# Patient Record
Sex: MALE | Race: WHITE | NOT HISPANIC OR LATINO
[De-identification: names, ages, dates, MRNs, and addresses within clinical notes are randomized per-mention and may not be internally consistent; named-entity substitution may affect disease eponyms.]

---

## 2017-06-14 ENCOUNTER — APPOINTMENT (OUTPATIENT)
Dept: OPHTHALMOLOGY | Facility: CLINIC | Age: 72
End: 2017-06-14

## 2017-06-14 PROBLEM — Z00.00 ENCOUNTER FOR PREVENTIVE HEALTH EXAMINATION: Status: ACTIVE | Noted: 2017-06-14

## 2017-11-20 ENCOUNTER — OUTPATIENT (OUTPATIENT)
Dept: OUTPATIENT SERVICES | Facility: HOSPITAL | Age: 72
LOS: 1 days | Discharge: HOME | End: 2017-11-20

## 2017-12-13 ENCOUNTER — APPOINTMENT (OUTPATIENT)
Dept: OPHTHALMOLOGY | Facility: CLINIC | Age: 72
End: 2017-12-13
Payer: COMMERCIAL

## 2017-12-13 PROCEDURE — 92014 COMPRE OPH EXAM EST PT 1/>: CPT

## 2018-06-13 ENCOUNTER — APPOINTMENT (OUTPATIENT)
Dept: OPHTHALMOLOGY | Facility: CLINIC | Age: 73
End: 2018-06-13
Payer: COMMERCIAL

## 2018-06-13 PROCEDURE — 92014 COMPRE OPH EXAM EST PT 1/>: CPT

## 2018-06-26 ENCOUNTER — RX RENEWAL (OUTPATIENT)
Age: 73
End: 2018-06-26

## 2018-06-26 DIAGNOSIS — H10.409 UNSPECIFIED CHRONIC CONJUNCTIVITIS, UNSPECIFIED EYE: ICD-10-CM

## 2018-06-26 RX ORDER — OFLOXACIN 3 MG/ML
0.3 SOLUTION/ DROPS OPHTHALMIC
Qty: 5 | Refills: 1 | Status: ACTIVE | COMMUNITY
Start: 2018-06-26 | End: 1900-01-01

## 2018-06-27 ENCOUNTER — RX RENEWAL (OUTPATIENT)
Age: 73
End: 2018-06-27

## 2018-12-04 ENCOUNTER — APPOINTMENT (OUTPATIENT)
Dept: OPHTHALMOLOGY | Facility: CLINIC | Age: 73
End: 2018-12-04
Payer: COMMERCIAL

## 2018-12-04 PROCEDURE — 92014 COMPRE OPH EXAM EST PT 1/>: CPT

## 2018-12-04 RX ORDER — DOXYCYCLINE HYCLATE 50 MG/1
50 CAPSULE ORAL
Qty: 30 | Refills: 3 | Status: ACTIVE | COMMUNITY
Start: 2018-12-04 | End: 1900-01-01

## 2019-06-05 ENCOUNTER — NON-APPOINTMENT (OUTPATIENT)
Age: 74
End: 2019-06-05

## 2019-06-05 ENCOUNTER — APPOINTMENT (OUTPATIENT)
Dept: OPHTHALMOLOGY | Facility: CLINIC | Age: 74
End: 2019-06-05
Payer: COMMERCIAL

## 2019-06-05 PROCEDURE — 92012 INTRM OPH EXAM EST PATIENT: CPT

## 2019-12-11 ENCOUNTER — APPOINTMENT (OUTPATIENT)
Dept: OPHTHALMOLOGY | Facility: CLINIC | Age: 74
End: 2019-12-11
Payer: COMMERCIAL

## 2019-12-11 ENCOUNTER — NON-APPOINTMENT (OUTPATIENT)
Age: 74
End: 2019-12-11

## 2019-12-11 PROCEDURE — 92014 COMPRE OPH EXAM EST PT 1/>: CPT

## 2020-06-17 ENCOUNTER — APPOINTMENT (OUTPATIENT)
Dept: OPHTHALMOLOGY | Facility: CLINIC | Age: 75
End: 2020-06-17
Payer: COMMERCIAL

## 2020-06-17 ENCOUNTER — NON-APPOINTMENT (OUTPATIENT)
Age: 75
End: 2020-06-17

## 2020-06-17 PROCEDURE — 92014 COMPRE OPH EXAM EST PT 1/>: CPT

## 2020-12-09 ENCOUNTER — APPOINTMENT (OUTPATIENT)
Dept: OPHTHALMOLOGY | Facility: CLINIC | Age: 75
End: 2020-12-09
Payer: COMMERCIAL

## 2020-12-09 ENCOUNTER — NON-APPOINTMENT (OUTPATIENT)
Age: 75
End: 2020-12-09

## 2020-12-09 PROCEDURE — 92014 COMPRE OPH EXAM EST PT 1/>: CPT

## 2020-12-09 PROCEDURE — 99072 ADDL SUPL MATRL&STAF TM PHE: CPT

## 2021-06-01 ENCOUNTER — NON-APPOINTMENT (OUTPATIENT)
Age: 76
End: 2021-06-01

## 2021-06-01 ENCOUNTER — APPOINTMENT (OUTPATIENT)
Dept: OPHTHALMOLOGY | Facility: CLINIC | Age: 76
End: 2021-06-01
Payer: COMMERCIAL

## 2021-06-01 PROCEDURE — 92014 COMPRE OPH EXAM EST PT 1/>: CPT

## 2021-06-01 PROCEDURE — 99072 ADDL SUPL MATRL&STAF TM PHE: CPT

## 2021-12-08 ENCOUNTER — NON-APPOINTMENT (OUTPATIENT)
Age: 76
End: 2021-12-08

## 2021-12-08 ENCOUNTER — APPOINTMENT (OUTPATIENT)
Dept: OPHTHALMOLOGY | Facility: CLINIC | Age: 76
End: 2021-12-08
Payer: COMMERCIAL

## 2021-12-08 PROCEDURE — 92136 OPHTHALMIC BIOMETRY: CPT

## 2021-12-08 PROCEDURE — 92012 INTRM OPH EXAM EST PATIENT: CPT

## 2022-08-17 ENCOUNTER — APPOINTMENT (OUTPATIENT)
Dept: OPHTHALMOLOGY | Facility: CLINIC | Age: 77
End: 2022-08-17

## 2022-11-16 ENCOUNTER — APPOINTMENT (OUTPATIENT)
Dept: RHEUMATOLOGY | Facility: CLINIC | Age: 77
End: 2022-11-16

## 2022-11-16 ENCOUNTER — NON-APPOINTMENT (OUTPATIENT)
Age: 77
End: 2022-11-16

## 2022-11-16 VITALS
TEMPERATURE: 98.2 F | HEIGHT: 67 IN | SYSTOLIC BLOOD PRESSURE: 111 MMHG | WEIGHT: 193 LBS | BODY MASS INDEX: 30.29 KG/M2 | OXYGEN SATURATION: 96 % | DIASTOLIC BLOOD PRESSURE: 75 MMHG | HEART RATE: 83 BPM

## 2022-11-16 DIAGNOSIS — Z78.9 OTHER SPECIFIED HEALTH STATUS: ICD-10-CM

## 2022-11-16 DIAGNOSIS — M79.10 MYALGIA, UNSPECIFIED SITE: ICD-10-CM

## 2022-11-16 DIAGNOSIS — Z82.49 FAMILY HISTORY OF ISCHEMIC HEART DISEASE AND OTHER DISEASES OF THE CIRCULATORY SYSTEM: ICD-10-CM

## 2022-11-16 DIAGNOSIS — Z86.39 PERSONAL HISTORY OF OTHER ENDOCRINE, NUTRITIONAL AND METABOLIC DISEASE: ICD-10-CM

## 2022-11-16 PROCEDURE — 99204 OFFICE O/P NEW MOD 45 MIN: CPT | Mod: 25

## 2022-11-16 PROCEDURE — 36415 COLL VENOUS BLD VENIPUNCTURE: CPT

## 2022-11-16 RX ORDER — ATORVASTATIN CALCIUM 40 MG/1
40 TABLET, FILM COATED ORAL
Qty: 90 | Refills: 0 | Status: ACTIVE | COMMUNITY
Start: 2022-03-14

## 2022-11-16 RX ORDER — PROPRANOLOL HYDROCHLORIDE 60 MG/1
60 CAPSULE, EXTENDED RELEASE ORAL
Qty: 90 | Refills: 0 | Status: ACTIVE | COMMUNITY
Start: 2022-09-28

## 2022-11-16 RX ORDER — MONTELUKAST SODIUM 10 MG/1
10 TABLET, FILM COATED ORAL
Refills: 0 | Status: ACTIVE | COMMUNITY

## 2022-11-16 RX ORDER — COLD-HOT PACK
EACH MISCELLANEOUS
Refills: 0 | Status: ACTIVE | COMMUNITY

## 2022-11-16 RX ORDER — UBIDECARENONE 200 MG
CAPSULE ORAL
Refills: 0 | Status: ACTIVE | COMMUNITY

## 2022-11-16 RX ORDER — TRAZODONE HYDROCHLORIDE 50 MG/1
50 TABLET ORAL
Refills: 0 | Status: ACTIVE | COMMUNITY

## 2022-11-16 RX ORDER — ASPIRIN 81 MG
81 TABLET, DELAYED RELEASE (ENTERIC COATED) ORAL
Refills: 0 | Status: ACTIVE | COMMUNITY

## 2022-11-16 NOTE — ASSESSMENT
[FreeTextEntry1] : This is a 77-year-old man he has had symptoms dating back to April 2022 approximately 7 months ago these are predominantly musculoskeletal pains and discomfort mostly in shoulder neck area as well as hand pain stiffness possible swelling symptoms are worse in the morning no associated constitutional symptoms or fevers he was initially treated with a Medrol Dosepak with improvement but symptoms recurred some point later he was subsequently placed on a 6-week course of prednisone starting at 40 mg a day he discontinued it approximately 6 weeks later at 25 mg a day he does report he felt better from a musculoskeletal standpoint while on prednisone however the response apparently took a few weeks before he first noted dramatic resolution he has been off all steroids since July 13 almost 4 months he does however have return of his musculoskeletal pain and stiffness but of concern is the recent onset of pain on the left side of his face when he chews this begins almost immediately on the first 2 it is only when he chews on the left side and goes away when he does not chew he again does not have any pain when he chews on his right side while jaw pain with chewing is very very concerning for giant cell arteritis I am not sure his symptoms are all that typical of jaw claudication which is felt to be due to ischemia and often begins after chewing for a while and is not necessarily associated with only chewing on one side and can often last a period of time after not chewing he is planning to see a dentist in the morning I am drawing bloods today including repeat inflammatory markers ESR CRP I am also repeating and checking a ferritin finally routine bloods are also going to be repeated-he has not had any visual symptoms except he reports while on prednisone back in the summer he does have cataracts and was concerned it could exacerbate that he also while his eye pressures have been normal apparently does have a narrow angle there is no scalp tenderness or nodularity or headache I discussed with both him and his primary care physician consideration of doing a temporal artery biopsy and restarting prednisone at 60 mg a day because of possible concerns of giant cell arteritis however I plan to wait to see if his blood work returns with marked inflammation and he will also be seeing his dentist in the morning I have asked him to evaluate him for possible dental issues he has been off prednisone for quite a number of months and therefore prednisone would not these inflammatory markers.\par FAX NOTE  Ivan Gaona [phone]

## 2022-11-16 NOTE — DATA REVIEWED
[FreeTextEntry1] : Addition to the data noted in the HPI additional studies faxed by his primary care physician these appear to be from June 2, 2022 sed rate previous bloods done May 19, 2022 Kushal's were normal CBC was normal TSH was normal urinalysis was negative additionally I have reviewed his chest x-ray done at [facility] radiology this does not appear to show any significant changes outside some hyperinflation he also had an MRI done this was early on in this process this was of his right shoulder which did show some tendinitis and degenerative changes

## 2022-11-16 NOTE — PHYSICAL EXAM
[General Appearance - Alert] : alert [General Appearance - In No Acute Distress] : in no acute distress [General Appearance - Well Nourished] : well nourished [General Appearance - Well Developed] : well developed [Sclera] : the sclera and conjunctiva were normal [PERRL With Normal Accommodation] : pupils were equal in size, round, and reactive to light [Neck Cervical Mass (___cm)] : no neck mass was observed [Edema] : there was no peripheral edema [] : no rash [Motor Exam] : the motor exam was normal [FreeTextEntry1] : There is some deep creased  strength in his right hand mild impingement of right shoulder but no clear-cut synovitis cervical spine motion is normal

## 2022-11-16 NOTE — REVIEW OF SYSTEMS
[As Noted in HPI] : as noted in HPI [Negative] : Integumentary [Fever] : no fever [Chills] : no chills [Feeling Poorly] : not feeling poorly [Dry Eyes] : no dryness of the eyes

## 2022-11-16 NOTE — HISTORY OF PRESENT ILLNESS
[FreeTextEntry1] : This is a 77-year-old man has been referred via a friend he has been advised to see a rheumatologist by his primary care physician-he has been seen and evaluated prior bloods have been reviewed and I have also discussed his clinical history and labs with his primary care physicianDR Ivan Gaona MD-apparently symptoms began back in April or May 2022 he began noticing some pain and stiffness in his shoulder and upper neck area he also had some swelling and stiffness in his MCP joints her hands particularly in that right hand symptoms are worse in the morning at that point she did not have any headaches or eye symptoms he had some initial blood work done these were all essentially normal he had parvo serologies which were negative his ESR was 11 and his CRP was 9 with the upper limits of normal being 8 CPK was normal at 40 Lyme serologies [facility] CCP rheumatoid factor and CBC were all negative he apparently was given a Medrol Dosepak and this did improve some the symptoms however subsequently things returned apparently the CRP was more elevated at that point and on June 1 he was started on 40 mg a day of prednisone with plans to taper it over a period of time as per patient he was in [place] at that point and he stopped it at 25 mg on July 13 about 6 weeks later he did report there was improvement in musculoskeletal discomfort however perhaps this took about 3 weeks to improve he did not early on have headaches or jaw pain or any visual symptoms but did feel the prednisone was creating some eye problems he does have a history of cataracts and while eye pressures have been normal there is apparently some narrow angle on his most recent eye evaluation with normal intra ocular pressures he has now been off of prednisone since mid July and is again noticing some neck and shoulder issues as well as pain and stiffness in hands with may be some swelling at the MCP joints of somewhat more concern is left jaw pain with chewing this starts as soon as he tries to chew down on the left side he believes it is both in the upper and lower area it stops once he stops chewing there is no delay he reports that when he chews on his right side he has no pain and only really occasional discomfort on his left side he is planning to see his dentist in the morning regarding this he has had no fevers chills night sweats or weight loss he does have a number of comorbid conditions he has a tremor he has elevated cholesterol he is taking aspirin and a atorvastatin he has not had a trial off of the statin-family history is essentially negative

## 2022-11-17 ENCOUNTER — NON-APPOINTMENT (OUTPATIENT)
Age: 77
End: 2022-11-17

## 2022-11-17 LAB
ALBUMIN SERPL ELPH-MCNC: 5 G/DL
ALP BLD-CCNC: 67 U/L
ALT SERPL-CCNC: 46 U/L
ANION GAP SERPL CALC-SCNC: 14 MMOL/L
AST SERPL-CCNC: 29 U/L
BASOPHILS # BLD AUTO: 0.05 K/UL
BASOPHILS NFR BLD AUTO: 0.6 %
BILIRUB SERPL-MCNC: 0.8 MG/DL
BUN SERPL-MCNC: 19 MG/DL
CALCIUM SERPL-MCNC: 10.9 MG/DL
CCP AB SER IA-ACNC: <8 UNITS
CHLORIDE SERPL-SCNC: 102 MMOL/L
CK SERPL-CCNC: 52 U/L
CO2 SERPL-SCNC: 24 MMOL/L
CREAT SERPL-MCNC: 0.93 MG/DL
CRP SERPL-MCNC: <3 MG/L
EGFR: 86 ML/MIN/1.73M2
EOSINOPHIL # BLD AUTO: 0.22 K/UL
EOSINOPHIL NFR BLD AUTO: 2.7 %
ERYTHROCYTE [SEDIMENTATION RATE] IN BLOOD BY WESTERGREN METHOD: 21 MM/HR
FERRITIN SERPL-MCNC: 569 NG/ML
GLUCOSE SERPL-MCNC: 114 MG/DL
HCT VFR BLD CALC: 48.6 %
HGB BLD-MCNC: 16.1 G/DL
IMM GRANULOCYTES NFR BLD AUTO: 0.2 %
LYMPHOCYTES # BLD AUTO: 2.81 K/UL
LYMPHOCYTES NFR BLD AUTO: 34.6 %
MAN DIFF?: NORMAL
MCHC RBC-ENTMCNC: 31.9 PG
MCHC RBC-ENTMCNC: 33.1 GM/DL
MCV RBC AUTO: 96.4 FL
MONOCYTES # BLD AUTO: 0.67 K/UL
MONOCYTES NFR BLD AUTO: 8.3 %
NEUTROPHILS # BLD AUTO: 4.34 K/UL
NEUTROPHILS NFR BLD AUTO: 53.6 %
PLATELET # BLD AUTO: 247 K/UL
POTASSIUM SERPL-SCNC: 5.2 MMOL/L
PROT SERPL-MCNC: 7.7 G/DL
RBC # BLD: 5.04 M/UL
RBC # FLD: 13.2 %
RF+CCP IGG SER-IMP: NEGATIVE
RHEUMATOID FACT SER QL: <10 IU/ML
SODIUM SERPL-SCNC: 140 MMOL/L
WBC # FLD AUTO: 8.11 K/UL

## 2022-11-18 LAB — ANA SER IF-ACNC: NEGATIVE

## 2022-12-13 ENCOUNTER — APPOINTMENT (OUTPATIENT)
Dept: OPHTHALMOLOGY | Facility: CLINIC | Age: 77
End: 2022-12-13

## 2022-12-13 ENCOUNTER — NON-APPOINTMENT (OUTPATIENT)
Age: 77
End: 2022-12-13

## 2022-12-13 PROCEDURE — 92014 COMPRE OPH EXAM EST PT 1/>: CPT

## 2022-12-13 PROCEDURE — 92134 CPTRZ OPH DX IMG PST SGM RTA: CPT

## 2022-12-13 PROCEDURE — 92136 OPHTHALMIC BIOMETRY: CPT

## 2023-05-24 ENCOUNTER — APPOINTMENT (OUTPATIENT)
Dept: OPHTHALMOLOGY | Facility: CLINIC | Age: 78
End: 2023-05-24
Payer: COMMERCIAL

## 2023-05-24 ENCOUNTER — NON-APPOINTMENT (OUTPATIENT)
Age: 78
End: 2023-05-24

## 2023-05-24 PROCEDURE — 92012 INTRM OPH EXAM EST PATIENT: CPT

## 2023-05-24 PROCEDURE — 92020 GONIOSCOPY: CPT

## 2023-11-01 ENCOUNTER — NON-APPOINTMENT (OUTPATIENT)
Age: 78
End: 2023-11-01

## 2023-11-01 ENCOUNTER — APPOINTMENT (OUTPATIENT)
Dept: OPHTHALMOLOGY | Facility: CLINIC | Age: 78
End: 2023-11-01
Payer: COMMERCIAL

## 2023-11-01 PROCEDURE — 92020 GONIOSCOPY: CPT

## 2023-11-01 PROCEDURE — 92012 INTRM OPH EXAM EST PATIENT: CPT

## 2024-05-08 ENCOUNTER — NON-APPOINTMENT (OUTPATIENT)
Age: 79
End: 2024-05-08

## 2024-05-08 ENCOUNTER — APPOINTMENT (OUTPATIENT)
Dept: OPHTHALMOLOGY | Facility: CLINIC | Age: 79
End: 2024-05-08
Payer: COMMERCIAL

## 2024-05-08 PROCEDURE — 92012 INTRM OPH EXAM EST PATIENT: CPT

## 2024-09-25 ENCOUNTER — APPOINTMENT (OUTPATIENT)
Dept: OPHTHALMOLOGY | Facility: CLINIC | Age: 79
End: 2024-09-25

## 2024-10-23 ENCOUNTER — APPOINTMENT (OUTPATIENT)
Dept: OPHTHALMOLOGY | Facility: CLINIC | Age: 79
End: 2024-10-23
Payer: COMMERCIAL

## 2024-10-23 ENCOUNTER — NON-APPOINTMENT (OUTPATIENT)
Age: 79
End: 2024-10-23

## 2024-10-23 PROCEDURE — 92020 GONIOSCOPY: CPT

## 2024-10-23 PROCEDURE — 92012 INTRM OPH EXAM EST PATIENT: CPT

## 2025-02-12 ENCOUNTER — APPOINTMENT (OUTPATIENT)
Dept: HEART AND VASCULAR | Facility: CLINIC | Age: 80
End: 2025-02-12
Payer: COMMERCIAL

## 2025-02-12 ENCOUNTER — NON-APPOINTMENT (OUTPATIENT)
Age: 80
End: 2025-02-12

## 2025-02-12 VITALS
DIASTOLIC BLOOD PRESSURE: 74 MMHG | OXYGEN SATURATION: 98 % | SYSTOLIC BLOOD PRESSURE: 136 MMHG | WEIGHT: 195 LBS | TEMPERATURE: 99.5 F | HEIGHT: 67 IN | BODY MASS INDEX: 30.61 KG/M2 | HEART RATE: 76 BPM

## 2025-02-12 DIAGNOSIS — E78.00 PURE HYPERCHOLESTEROLEMIA, UNSPECIFIED: ICD-10-CM

## 2025-02-12 DIAGNOSIS — R03.0 ELEVATED BLOOD-PRESSURE READING, W/OUT DIAGNOSIS OF HYPERTENSION: ICD-10-CM

## 2025-02-12 PROCEDURE — 93000 ELECTROCARDIOGRAM COMPLETE: CPT

## 2025-02-12 PROCEDURE — 93784 AMBL BP MNTR W/SOFTWARE: CPT

## 2025-02-12 PROCEDURE — 99204 OFFICE O/P NEW MOD 45 MIN: CPT

## 2025-02-12 PROCEDURE — 36415 COLL VENOUS BLD VENIPUNCTURE: CPT

## 2025-02-12 RX ORDER — ASPIRIN 81 MG
81 TABLET, DELAYED RELEASE (ENTERIC COATED) ORAL DAILY
Refills: 3 | Status: ACTIVE | COMMUNITY

## 2025-02-12 RX ORDER — ALPRAZOLAM 0.5 MG/1
0.5 TABLET ORAL
Refills: 0 | Status: ACTIVE | COMMUNITY

## 2025-02-12 RX ORDER — PROPRANOLOL HYDROCHLORIDE 60 MG/1
60 CAPSULE, EXTENDED RELEASE ORAL
Qty: 14 | Refills: 0 | Status: ACTIVE | COMMUNITY

## 2025-02-12 RX ORDER — MONTELUKAST 10 MG/1
10 TABLET, FILM COATED ORAL
Refills: 0 | Status: ACTIVE | COMMUNITY

## 2025-02-12 RX ORDER — ATORVASTATIN CALCIUM 40 MG/1
40 TABLET, FILM COATED ORAL DAILY
Refills: 0 | Status: ACTIVE | COMMUNITY

## 2025-02-12 RX ORDER — TRAZODONE HYDROCHLORIDE 50 MG/1
50 TABLET ORAL DAILY
Refills: 0 | Status: ACTIVE | COMMUNITY

## 2025-02-13 LAB
ALBUMIN SERPL ELPH-MCNC: 4.6 G/DL
ALP BLD-CCNC: 62 U/L
ALT SERPL-CCNC: 36 U/L
ANION GAP SERPL CALC-SCNC: 11 MMOL/L
AST SERPL-CCNC: 22 U/L
BILIRUB SERPL-MCNC: 0.9 MG/DL
BUN SERPL-MCNC: 16 MG/DL
CALCIUM SERPL-MCNC: 9.7 MG/DL
CHLORIDE SERPL-SCNC: 102 MMOL/L
CHOLEST SERPL-MCNC: 150 MG/DL
CK SERPL-CCNC: 65 U/L
CO2 SERPL-SCNC: 27 MMOL/L
CREAT SERPL-MCNC: 0.86 MG/DL
CREAT SPEC-SCNC: 169 MG/DL
EGFR: 89 ML/MIN/1.73M2
ESTIMATED AVERAGE GLUCOSE: 134 MG/DL
GLUCOSE SERPL-MCNC: 119 MG/DL
HBA1C MFR BLD HPLC: 6.3 %
HDLC SERPL-MCNC: 43 MG/DL
LDLC SERPL CALC-MCNC: 82 MG/DL
MICROALBUMIN 24H UR DL<=1MG/L-MCNC: <1.2 MG/DL
MICROALBUMIN/CREAT 24H UR-RTO: NORMAL MG/G
NONHDLC SERPL-MCNC: 107 MG/DL
NT-PROBNP SERPL-MCNC: <36 PG/ML
POTASSIUM SERPL-SCNC: 5.2 MMOL/L
PROT SERPL-MCNC: 7.4 G/DL
SODIUM SERPL-SCNC: 139 MMOL/L
TRIGL SERPL-MCNC: 139 MG/DL

## 2025-02-13 RX ORDER — TIRZEPATIDE 2.5 MG/.5ML
2.5 INJECTION, SOLUTION SUBCUTANEOUS
Qty: 1 | Refills: 0 | Status: ACTIVE | COMMUNITY
Start: 2025-02-13 | End: 1900-01-01

## 2025-02-17 PROCEDURE — 93784 AMBL BP MNTR W/SOFTWARE: CPT

## 2025-03-03 ENCOUNTER — RX RENEWAL (OUTPATIENT)
Age: 80
End: 2025-03-03

## 2025-03-27 ENCOUNTER — NON-APPOINTMENT (OUTPATIENT)
Age: 80
End: 2025-03-27

## 2025-03-27 ENCOUNTER — APPOINTMENT (OUTPATIENT)
Dept: HEART AND VASCULAR | Facility: CLINIC | Age: 80
End: 2025-03-27
Payer: COMMERCIAL

## 2025-03-27 VITALS
WEIGHT: 193 LBS | BODY MASS INDEX: 30.29 KG/M2 | TEMPERATURE: 98.5 F | SYSTOLIC BLOOD PRESSURE: 130 MMHG | DIASTOLIC BLOOD PRESSURE: 78 MMHG | HEIGHT: 67 IN | OXYGEN SATURATION: 96 %

## 2025-03-27 DIAGNOSIS — E78.00 PURE HYPERCHOLESTEROLEMIA, UNSPECIFIED: ICD-10-CM

## 2025-03-27 PROCEDURE — 99214 OFFICE O/P EST MOD 30 MIN: CPT | Mod: 25

## 2025-03-27 PROCEDURE — 93000 ELECTROCARDIOGRAM COMPLETE: CPT

## 2025-03-27 RX ORDER — EZETIMIBE 10 MG/1
10 TABLET ORAL
Qty: 90 | Refills: 3 | Status: ACTIVE | COMMUNITY
Start: 2025-03-27 | End: 1900-01-01

## 2025-04-23 ENCOUNTER — NON-APPOINTMENT (OUTPATIENT)
Age: 80
End: 2025-04-23

## 2025-04-23 ENCOUNTER — APPOINTMENT (OUTPATIENT)
Dept: OPHTHALMOLOGY | Facility: CLINIC | Age: 80
End: 2025-04-23
Payer: COMMERCIAL

## 2025-04-23 PROCEDURE — 92012 INTRM OPH EXAM EST PATIENT: CPT

## 2025-05-22 ENCOUNTER — APPOINTMENT (OUTPATIENT)
Dept: HEART AND VASCULAR | Facility: CLINIC | Age: 80
End: 2025-05-22
Payer: COMMERCIAL

## 2025-05-22 ENCOUNTER — NON-APPOINTMENT (OUTPATIENT)
Age: 80
End: 2025-05-22

## 2025-05-22 VITALS
HEIGHT: 67 IN | WEIGHT: 193 LBS | SYSTOLIC BLOOD PRESSURE: 122 MMHG | DIASTOLIC BLOOD PRESSURE: 72 MMHG | TEMPERATURE: 98.8 F | HEART RATE: 79 BPM | BODY MASS INDEX: 30.29 KG/M2 | OXYGEN SATURATION: 97 %

## 2025-05-22 DIAGNOSIS — E78.00 PURE HYPERCHOLESTEROLEMIA, UNSPECIFIED: ICD-10-CM

## 2025-05-22 PROCEDURE — G2211 COMPLEX E/M VISIT ADD ON: CPT | Mod: NC

## 2025-05-22 PROCEDURE — 93000 ELECTROCARDIOGRAM COMPLETE: CPT

## 2025-05-22 PROCEDURE — 99214 OFFICE O/P EST MOD 30 MIN: CPT
